# Patient Record
Sex: MALE | Race: WHITE | NOT HISPANIC OR LATINO | Employment: OTHER | URBAN - METROPOLITAN AREA
[De-identification: names, ages, dates, MRNs, and addresses within clinical notes are randomized per-mention and may not be internally consistent; named-entity substitution may affect disease eponyms.]

---

## 2018-07-03 ENCOUNTER — TRANSFERRED RECORDS (OUTPATIENT)
Dept: HEALTH INFORMATION MANAGEMENT | Facility: CLINIC | Age: 73
End: 2018-07-03

## 2018-07-04 ENCOUNTER — TRANSFERRED RECORDS (OUTPATIENT)
Dept: HEALTH INFORMATION MANAGEMENT | Facility: CLINIC | Age: 73
End: 2018-07-04

## 2018-07-05 ENCOUNTER — TRANSFERRED RECORDS (OUTPATIENT)
Dept: HEALTH INFORMATION MANAGEMENT | Facility: CLINIC | Age: 73
End: 2018-07-05

## 2018-07-06 ENCOUNTER — TRANSFERRED RECORDS (OUTPATIENT)
Dept: HEALTH INFORMATION MANAGEMENT | Facility: CLINIC | Age: 73
End: 2018-07-06

## 2018-07-18 ENCOUNTER — OFFICE VISIT (OUTPATIENT)
Dept: NEUROSURGERY | Facility: CLINIC | Age: 73
End: 2018-07-18
Attending: NEUROLOGICAL SURGERY
Payer: OTHER GOVERNMENT

## 2018-07-18 VITALS
HEIGHT: 72 IN | BODY MASS INDEX: 34.13 KG/M2 | HEART RATE: 84 BPM | OXYGEN SATURATION: 97 % | TEMPERATURE: 96.9 F | DIASTOLIC BLOOD PRESSURE: 68 MMHG | WEIGHT: 252 LBS | SYSTOLIC BLOOD PRESSURE: 115 MMHG

## 2018-07-18 DIAGNOSIS — M54.12 CERVICAL RADICULAR PAIN: Primary | ICD-10-CM

## 2018-07-18 PROCEDURE — G0463 HOSPITAL OUTPT CLINIC VISIT: HCPCS

## 2018-07-18 PROCEDURE — 99243 OFF/OP CNSLTJ NEW/EST LOW 30: CPT | Performed by: NURSE PRACTITIONER

## 2018-07-18 RX ORDER — NORTRIPTYLINE HCL 25 MG
2 CAPSULE ORAL
COMMUNITY
Start: 2014-10-24

## 2018-07-18 RX ORDER — LISINOPRIL AND HYDROCHLOROTHIAZIDE 12.5; 2 MG/1; MG/1
TABLET ORAL
COMMUNITY
Start: 2018-04-07

## 2018-07-18 RX ORDER — ALLOPURINOL 100 MG/1
100 TABLET ORAL
Refills: 0 | COMMUNITY
Start: 2018-05-30

## 2018-07-18 RX ORDER — TROSPIUM CHLORIDE 20 MG/1
25 TABLET, FILM COATED ORAL
COMMUNITY
Start: 2018-06-01

## 2018-07-18 ASSESSMENT — PAIN SCALES - GENERAL: PAINLEVEL: MILD PAIN (2)

## 2018-07-18 NOTE — NURSING NOTE
Delfino Blankenship is a 73 year old male who presents for:  Chief Complaint   Patient presents with     Neurologic Problem     Neck pain MRI done @ [facility], no PT/INJ / records from [facility] [place] (spoke with pt he will bring in CD with images JM)        Vitals:    Vitals:    07/18/18 0918   BP: 115/68   BP Location: Right arm   Patient Position: Chair   Cuff Size: Adult Large   Pulse: 84   Temp: 96.9  F (36.1  C)   TempSrc: Oral   SpO2: 97%   Weight: 252 lb (114.3 kg)   Height: 6' (1.829 m)       BMI:  Estimated body mass index is 34.18 kg/(m^2) as calculated from the following:    Height as of this encounter: 6' (1.829 m).    Weight as of this encounter: 252 lb (114.3 kg).    Pain Score:  Mild Pain (2)        Dora Quezada

## 2018-07-18 NOTE — MR AVS SNAPSHOT
"              After Visit Summary   2018    Delfino Blankenship    MRN: [id number]           Patient Information     Date Of Birth          1945        Visit Information        Provider Department      2018 9:10 AM Madelin Larkin APRN CNP [facility]        Care Instructions    Discharge instructions:    Get an EMG of your left arm.  They will call you to schedule.      Follow up with Dr Ziegler after EMG is completed.              Follow-ups after your visit        Who to contact     If you have questions or need follow up information about today's clinic visit or your schedule please contact [facility] directly at [phone].  Normal or non-critical lab and imaging results will be communicated to you by MyChart, letter or phone within 4 business days after the clinic has received the results. If you do not hear from us within 7 days, please contact the clinic through MyChart or phone. If you have a critical or abnormal lab result, we will notify you by phone as soon as possible.  Submit refill requests through [employer] or call your pharmacy and they will forward the refill request to us. Please allow 3 business days for your refill to be completed.          Additional Information About Your Visit        MyChart Information     [employer] lets you send messages to your doctor, view your test results, renew your prescriptions, schedule appointments and more. To sign up, go to [email or web address] . Click on \"Log in\" on the left side of the screen, which will take you to the Welcome page. Then click on \"Sign up Now\" on the right side of the page.     You will be asked to enter the access code listed below, as well as some personal information. Please follow the directions to create your username and password.     Your access code is: [email or web address]  Expires: 10/16/2018  8:58 AM     Your access code will  in 90 days. If you need help or a new code, please " call your [facility] clinic or [phone].        Care EveryWhere ID     This is your Care EveryWhere ID. This could be used by other organizations to access your [facility] medical records  [id number]        Your Vitals Were     Pulse Temperature Height Pulse Oximetry BMI (Body Mass Index)       84 96.9  F (36.1  C) (Oral) 6' (1.829 m) 97% 34.18 kg/m2        Blood Pressure from Last 3 Encounters:   07/18/18 115/68    Weight from Last 3 Encounters:   07/18/18 252 lb (114.3 kg)              Today, you had the following     No orders found for display       Primary Care Provider Office Phone # Fax #    Raymundo Charles Nayak [phone]       [facility] [address] [facility] [facility] [address]        Equal Access to Services     CODY MATAMOROS : Hadii aad ku hadasho Soomaali, waaxda luqadaha, qaybta kaalmada adeegyada, bertha lance . So [facility] [phone].    ATENCIÓN: Si habla español, tiene a avila disposición servicios gratuitos de asistencia lingüística. Hugo al [phone].    We comply with applicable federal civil rights laws and [place] laws. We do not discriminate on the basis of race, color, national origin, age, disability, sex, sexual orientation, or gender identity.            Thank you!     Thank you for choosing [facility]  for your care. Our goal is always to provide you with excellent care. Hearing back from our patients is one way we can continue to improve our services. Please take a few minutes to complete the written survey that you may receive in the mail after your visit with us. Thank you!             Your Updated Medication List - Protect others around you: Learn how to safely use, store and throw away your medicines at www.disposemymeds.org.          This list is accurate as of 7/18/18 10:17 AM.  Always use your most recent med list.                   Brand Name Dispense Instructions for use Diagnosis    allopurinol  100 MG tablet    ZYLOPRIM     100 mg        lisinopril-hydrochlorothiazide 20-12.5 MG per tablet    PRINZIDE/ZESTORETIC          nortriptyline 25 MG capsule    PAMELOR     Take 2 capsules by mouth        trospium 20 MG tablet    SANCTURA     25 mg

## 2018-07-18 NOTE — PATIENT INSTRUCTIONS
Discharge instructions:    Get an EMG of your left arm.  They will call you to schedule.      Follow up with Dr Ziegler after EMG is completed.

## 2018-07-18 NOTE — LETTER
7/18/2018         RE: Delfino Blankenship  [id number] [address] [address]        Dear Colleague,    Thank you for referring your patient, Delfino Blankenship, to the [facility]. Please see a copy of my visit note below.    Dr. Pete Ziegler  [facility]  Neurosurgery Clinic Visit        CC: neck and left arm pain    Primary care Provider: Zackary Nayak      Reason For Visit:   I was asked by Dr. Nayak to consult on the patient for cervical pain.      HPI: Delfino Blankenship is a 73 year old male with cervical radicular pain on the left.  He reports that he has had pain for many years. He went to a NS years ago and was told that they did not know where to begin. He went to the pain clinic and his pain has been managed on oral Nortriptyline. He then woke up on 7-3.-2018 and felt the worse neck, left arm and chest pain he has every had. He called EMS. A cardiac cause was ruled out.  He states that he was told he had a pinched nerve to his neck. He now notes neck pain with left scapular pain with neck pain. He has radicular pain down the outer portion of his left arm to the forearm with numbness to his last 2 fingers.  He is unable to full extend his hand.  He was also given Prednisone which did not help.  He states that has weakness in the left hand.  He is left handed as well. He has not had PT or injections for his pain. He states that laying down in a certain position in the recliner makes it slightly better otherwise he has constant pain.     Pain at its worst 10  Pain right now:  2    No past medical history on file.    Past Medical History reviewed with patient during visit.    No past surgical history on file.  Past Surgical History reviewed with patient during visit.    Current Outpatient Prescriptions   Medication     allopurinol (ZYLOPRIM) 100 MG tablet     lisinopril-hydrochlorothiazide (PRINZIDE/ZESTORETIC) 20-12.5 MG per tablet     nortriptyline  (PAMELOR) 25 MG capsule     trospium (SANCTURA) 20 MG tablet     No current facility-administered medications for this visit.        Not on File    Social History     Social History     Marital status:      Spouse name: N/A     Number of children: N/A     Years of education: N/A     Social History Main Topics     Smoking status: Not on file     Smokeless tobacco: Not on file     Alcohol use Not on file     Drug use: Not on file     Sexual activity: Not on file     Other Topics Concern     Not on file     Social History Narrative       No family history on file.      Review Of Systems  Skin: negative  Eyes: negative  Ears/Nose/Throat: negative  Respiratory: No shortness of breath, dyspnea on exertion, cough, or hemoptysis  Cardiovascular: HTN  Gastrointestinal: negative  Genitourinary: prostate CA with radiation   Musculoskeletal: neck pain, bilateral shoulder surgery, 6 hand surgeries.    Neurologic: left upper extremity pain and weakness  Psychiatric: negative  Hematologic/Lymphatic/Immunologic: negative  Endocrine: negative     ROS: 10 point ROS neg other than the symptoms noted above in the HPI.    Vital Signs: /68 (BP Location: Right arm, Patient Position: Chair, Cuff Size: Adult Large)  Pulse 84  Temp 96.9  F (36.1  C) (Oral)  Ht 6' (1.829 m)  Wt 252 lb (114.3 kg)  SpO2 97%  BMI 34.18 kg/m2    Examination:  Constitutional:  Alert, well nourished, NAD.  Memory: recent and remote memory intact  HEENT: Normocephalic, atraumatic.   Pulm:  Without shortness of breath   CV:  No pitting edema of BLE.    Neurological:  Awake  Alert  Oriented x 3  Speech clear  Cranial nerves II - XII intact  PERRL  EOMI  Face symmetric  Tongue midline  Motor exam   Shoulder Abduction:  Right:  5/5   Left:  5/5  Biceps:                      Right:  5/5   Left:  5/5  Triceps:                     Right:  5/5   Left:  5/5  Wrist Extensors:       Right:  5/5   Left:  3/5  Wrist Flexors:           Right:  5/5   Left:   3/5  Intrinsics:                   Right:  5/5   Left:  5/5   Hip Flexor:                Right: 5/5  Left:  5/5  Hip Adductor:             Right:  5/5  Left:  5/5  Hip Abductor:             Right:  5/5  Left:  5/5  Gastroc Soleus:        Right:  5/5  Left:  5/5  Tib/Ant:                      Right:  5/5  Left:  5/5  EHL:                          Right:  5/5  Left:  5/5   Sensation normal to bilateral upper and lower extremities  Muscle tone to bilateral upper and lower extremities normal   Gait: Able to stand from a seated position. Normal non-antalgic, non-myelopathic gait.    Cervical examination reveals restricted and painful extension. Tenderness to palpation of the cervical spine and paraspinous muscles bilaterally.      Imaging:     Cervical MRI:    7-6-2018    1. Cervical spondylosis and facet arthrosis resulting in foraminal stenosis, marked bilaterally from C3-4 through C5-6 and on the left at C6-7, also moderate acquired canal stenosis, multilevel cord contouring present although cord signal is normal.    2.  Facet arthrosis, marked bilaterally at C3-4 and on the left at C2-3, C4-5 and C7-T1.     Assessment/Plan:    Delfino Blankenship is a 73 year old male with cervical radicular pain on the left.  He reports that he has had pain for many years. He went to a NSG years ago and was told that they did not know where to begin. He went to the pain clinic and his pain has been managed on oral Nortriptyline. He then woke up on 7-3.-2018 and felt the worse neck, left arm and chest pain he has every had. He called EMS. A cardiac cause was ruled out.  He states that he was told he had a pinched nerve to his neck. He now notes neck pain with left scapular pain with neck pain. He has radicular pain down the outer portion of his left arm to the forearm with numbness to his last 2 fingers.  He is unable to full extend his hand.  He was also given Prednisone which did not help.  He states that has weakness in the left hand.   He is left handed as well. He has not had PT or injections for his pain. He states that laying down in a certain position in the recliner makes it slightly better otherwise he has constant pain. The pt has noted cervical restriction and contracted left middle, fourth and pinky finger. He is unable to straighten them but has full  strength.  He was seen by Dr. Pete Ziegler as well.  His MRI does not correlate with the hand symptoms. At this time we will have him obtain an EMG and have him return to see Dr. Pete Ziegler.      Patient Instructions   Discharge instructions:    Get an EMG of your left arm.  They will call you to schedule.      Follow up with Dr Ziegler after EMG is completed.               Madelin Larkin CNP  [facility]  [address] [address]    Tel [phone]  Pager [phone]      Again, thank you for allowing me to participate in the care of your patient.        Sincerely,        LAKE Kim CNP

## 2018-07-18 NOTE — PROGRESS NOTES
Dr. Pete Ziegler  [facility]  Neurosurgery Clinic Visit        CC: neck and left arm pain    Primary care Provider: Zackary Nayak      Reason For Visit:   I was asked by Dr. Nayak to consult on the patient for cervical pain.      HPI: Delfino Blankenship is a 73 year old male with cervical radicular pain on the left.  He reports that he has had pain for many years. He went to a NS years ago and was told that they did not know where to begin. He went to the pain clinic and his pain has been managed on oral Nortriptyline. He then woke up on 7-3.-2018 and felt the worse neck, left arm and chest pain he has every had. He called EMS. A cardiac cause was ruled out.  He states that he was told he had a pinched nerve to his neck. He now notes neck pain with left scapular pain with neck pain. He has radicular pain down the outer portion of his left arm to the forearm with numbness to his last 2 fingers.  He is unable to full extend his hand.  He was also given Prednisone which did not help.  He states that has weakness in the left hand.  He is left handed as well. He has not had PT or injections for his pain. He states that laying down in a certain position in the recliner makes it slightly better otherwise he has constant pain.     Pain at its worst 10  Pain right now:  2    No past medical history on file.    Past Medical History reviewed with patient during visit.    No past surgical history on file.  Past Surgical History reviewed with patient during visit.    Current Outpatient Prescriptions   Medication     allopurinol (ZYLOPRIM) 100 MG tablet     lisinopril-hydrochlorothiazide (PRINZIDE/ZESTORETIC) 20-12.5 MG per tablet     nortriptyline (PAMELOR) 25 MG capsule     trospium (SANCTURA) 20 MG tablet     No current facility-administered medications for this visit.        Not on File    Social History     Social History     Marital status:      Spouse name: N/A     Number of  children: N/A     Years of education: N/A     Social History Main Topics     Smoking status: Not on file     Smokeless tobacco: Not on file     Alcohol use Not on file     Drug use: Not on file     Sexual activity: Not on file     Other Topics Concern     Not on file     Social History Narrative       No family history on file.      Review Of Systems  Skin: negative  Eyes: negative  Ears/Nose/Throat: negative  Respiratory: No shortness of breath, dyspnea on exertion, cough, or hemoptysis  Cardiovascular: HTN  Gastrointestinal: negative  Genitourinary: prostate CA with radiation   Musculoskeletal: neck pain, bilateral shoulder surgery, 6 hand surgeries.    Neurologic: left upper extremity pain and weakness  Psychiatric: negative  Hematologic/Lymphatic/Immunologic: negative  Endocrine: negative     ROS: 10 point ROS neg other than the symptoms noted above in the HPI.    Vital Signs: /68 (BP Location: Right arm, Patient Position: Chair, Cuff Size: Adult Large)  Pulse 84  Temp 96.9  F (36.1  C) (Oral)  Ht 6' (1.829 m)  Wt 252 lb (114.3 kg)  SpO2 97%  BMI 34.18 kg/m2    Examination:  Constitutional:  Alert, well nourished, NAD.  Memory: recent and remote memory intact  HEENT: Normocephalic, atraumatic.   Pulm:  Without shortness of breath   CV:  No pitting edema of BLE.    Neurological:  Awake  Alert  Oriented x 3  Speech clear  Cranial nerves II - XII intact  PERRL  EOMI  Face symmetric  Tongue midline  Motor exam   Shoulder Abduction:  Right:  5/5   Left:  5/5  Biceps:                      Right:  5/5   Left:  5/5  Triceps:                     Right:  5/5   Left:  5/5  Wrist Extensors:       Right:  5/5   Left:  3/5  Wrist Flexors:           Right:  5/5   Left:  3/5  Intrinsics:                   Right:  5/5   Left:  5/5   Hip Flexor:                Right: 5/5  Left:  5/5  Hip Adductor:             Right:  5/5  Left:  5/5  Hip Abductor:             Right:  5/5  Left:  5/5  Gastroc Soleus:        Right:  5/5   Left:  5/5  Tib/Ant:                      Right:  5/5  Left:  5/5  EHL:                          Right:  5/5  Left:  5/5   Sensation normal to bilateral upper and lower extremities  Muscle tone to bilateral upper and lower extremities normal   Gait: Able to stand from a seated position. Normal non-antalgic, non-myelopathic gait.    Cervical examination reveals restricted and painful extension. Tenderness to palpation of the cervical spine and paraspinous muscles bilaterally.      Imaging:     Cervical MRI:    7-6-2018    1. Cervical spondylosis and facet arthrosis resulting in foraminal stenosis, marked bilaterally from C3-4 through C5-6 and on the left at C6-7, also moderate acquired canal stenosis, multilevel cord contouring present although cord signal is normal.    2.  Facet arthrosis, marked bilaterally at C3-4 and on the left at C2-3, C4-5 and C7-T1.     Assessment/Plan:    Delfino Blankenship is a 73 year old male with cervical radicular pain on the left.  He reports that he has had pain for many years. He went to a NS years ago and was told that they did not know where to begin. He went to the pain clinic and his pain has been managed on oral Nortriptyline. He then woke up on 7-3.-2018 and felt the worse neck, left arm and chest pain he has every had. He called EMS. A cardiac cause was ruled out.  He states that he was told he had a pinched nerve to his neck. He now notes neck pain with left scapular pain with neck pain. He has radicular pain down the outer portion of his left arm to the forearm with numbness to his last 2 fingers.  He is unable to full extend his hand.  He was also given Prednisone which did not help.  He states that has weakness in the left hand.  He is left handed as well. He has not had PT or injections for his pain. He states that laying down in a certain position in the recliner makes it slightly better otherwise he has constant pain. The pt has noted cervical restriction and contracted  left middle, fourth and pinky finger. He is unable to straighten them but has full  strength.  He was seen by Dr. Pete Ziegler as well.  His MRI does not correlate with the hand symptoms. At this time we will have him obtain an EMG and have him return to see Dr. Pete Ziegler.      Patient Instructions   Discharge instructions:    Get an EMG of your left arm.  They will call you to schedule.      Follow up with Dr Ziegler after EMG is completed.               Madelin Larkin [facility]  [facility]  [address] [address]    Tel [phone]  Pager [phone]

## 2018-08-01 ENCOUNTER — TRANSFERRED RECORDS (OUTPATIENT)
Dept: HEALTH INFORMATION MANAGEMENT | Facility: CLINIC | Age: 73
End: 2018-08-01

## 2018-08-07 ENCOUNTER — TELEPHONE (OUTPATIENT)
Dept: NEUROSURGERY | Facility: CLINIC | Age: 73
End: 2018-08-07

## 2018-08-07 NOTE — TELEPHONE ENCOUNTER
pts EMG shows possible cervical radiculopathy at C8 vs. Lower trunk or plexus injury on the left.

## 2018-08-29 ENCOUNTER — RADIANT APPOINTMENT (OUTPATIENT)
Dept: GENERAL RADIOLOGY | Facility: CLINIC | Age: 73
End: 2018-08-29
Attending: NURSE PRACTITIONER
Payer: OTHER GOVERNMENT

## 2018-08-29 ENCOUNTER — OFFICE VISIT (OUTPATIENT)
Dept: NEUROSURGERY | Facility: CLINIC | Age: 73
End: 2018-08-29
Attending: NEUROLOGICAL SURGERY
Payer: OTHER GOVERNMENT

## 2018-08-29 VITALS
DIASTOLIC BLOOD PRESSURE: 75 MMHG | TEMPERATURE: 97.4 F | BODY MASS INDEX: 34.13 KG/M2 | WEIGHT: 252 LBS | HEIGHT: 72 IN | OXYGEN SATURATION: 98 % | HEART RATE: 74 BPM | SYSTOLIC BLOOD PRESSURE: 122 MMHG

## 2018-08-29 DIAGNOSIS — M54.12 CERVICAL RADICULAR PAIN: ICD-10-CM

## 2018-08-29 DIAGNOSIS — R29.898 LEFT HAND WEAKNESS: ICD-10-CM

## 2018-08-29 DIAGNOSIS — M54.12 CERVICAL RADICULAR PAIN: Primary | ICD-10-CM

## 2018-08-29 PROCEDURE — 72020 X-RAY EXAM OF SPINE 1 VIEW: CPT

## 2018-08-29 PROCEDURE — G0463 HOSPITAL OUTPT CLINIC VISIT: HCPCS

## 2018-08-29 PROCEDURE — 99213 OFFICE O/P EST LOW 20 MIN: CPT | Performed by: NEUROLOGICAL SURGERY

## 2018-08-29 RX ORDER — ASPIRIN 81 MG/1
81 TABLET ORAL DAILY
COMMUNITY

## 2018-08-29 ASSESSMENT — PAIN SCALES - GENERAL: PAINLEVEL: NO PAIN (0)

## 2018-08-29 NOTE — LETTER
8/29/2018         RE: Delfino Blankenship  [id number] [address] [address]        Dear Colleague,    Thank you for referring your patient, Delfino Blankenship, to the [facility]. Please see a copy of my visit note below.    Neurosurgery Follow Up Clinic Visit      Delfino Blankenship returns for follow up visit regarding his left upper extremity weakness    Currently the patient describes having woken up on July 2 with severe pain down his left upper extremity into his hand mostly affecting the third through fifth digits.  He begin to develop weakness in his hand and also atrophy, but, his radicular pain has significantly improved.  He has had an epidural steroid injection without resolution and has not tried physical therapy.    Exam is stable  He is noted to have significant atrophy in his left hand he has 5/5 deltoid, 5/5 biceps, 4/5 triceps all in  strength and intrinsics are 4-/5 in the left upper extremity    EMG/NCV reveals left C8 radiculopathy versus brachial plexus injury    We reviewed the more of the cervical spine that shows multilevel spondylosis with foraminal stenosis and some left-sided stenosis at C6-7 and C7/T1      Plan:   I discussed with the patient option of C6 through T1 ACDF and he would discuss with his spouse and call if he wants to proceed  We will also obtain a right lateral cervical x-ray              Again, thank you for allowing me to participate in the care of your patient.        Sincerely,        Pete Ziegler MD

## 2018-08-29 NOTE — NURSING NOTE
Delfino Blankenship is a 73 year old male who presents for:  Chief Complaint   Patient presents with     Neurologic Problem     follow up cervical radiculopathy, discuss EMG and MRI        Vitals:    There were no vitals filed for this visit.    BMI:  Estimated body mass index is 34.18 kg/(m^2) as calculated from the following:    Height as of 7/18/18: 6' (1.829 m).    Weight as of 7/18/18: 252 lb (114.3 kg).    Pain Score:  Data Unavailable        Karla Miller CMA, AAS

## 2018-08-29 NOTE — PROGRESS NOTES
Neurosurgery Follow Up Clinic Visit      Delfino Blankenship returns for follow up visit regarding his left upper extremity weakness    Currently the patient describes having woken up on July 2 with severe pain down his left upper extremity into his hand mostly affecting the third through fifth digits.  He begin to develop weakness in his hand and also atrophy, but, his radicular pain has significantly improved.  He has had an epidural steroid injection without resolution and has not tried physical therapy.    Exam is stable  He is noted to have significant atrophy in his left hand he has 5/5 deltoid, 5/5 biceps, 4/5 triceps all in  strength and intrinsics are 4-/5 in the left upper extremity    EMG/NCV reveals left C8 radiculopathy versus brachial plexus injury    We reviewed the more of the cervical spine that shows multilevel spondylosis with foraminal stenosis and some left-sided stenosis at C6-7 and C7/T1      Plan:   I discussed with the patient option of C6 through T1 ACDF and he would discuss with his spouse and call if he wants to proceed  We will also obtain a right lateral cervical x-ray

## 2018-08-29 NOTE — MR AVS SNAPSHOT
After Visit Summary   8/29/2018    Delfino Blankenship    MRN: [id number]           Patient Information     Date Of Birth          1945        Visit Information        Provider Department      8/29/2018 11:10 AM Pete Ziegler MD [facility]        Today's Diagnoses     Cervical radicular pain    -  1      Care Instructions    1.  Xray today      2. Please contact the clinic if pain persists at [phone].            Follow-ups after your visit        Who to contact     If you have questions or need follow up information about today's clinic visit or your schedule please contact [place] [facility] directly at [phone].  Normal or non-critical lab and imaging results will be communicated to you by MyChart, letter or phone within 4 business days after the clinic has received the results. If you do not hear from us within 7 days, please contact the clinic through [employer] or phone. If you have a critical or abnormal lab result, we will notify you by phone as soon as possible.  Submit refill requests through [employer] or call your pharmacy and they will forward the refill request to us. Please allow 3 business days for your refill to be completed.          Additional Information About Your Visit        MyChart Information     [employer] gives you secure access to your electronic health record. If you see a primary care provider, you can also send messages to your care team and make appointments. If you have questions, please call your primary care clinic.  If you do not have a primary care provider, please call [phone] and they will assist you.        Care EveryWhere ID     This is your Care EveryWhere ID. This could be used by other organizations to access your [place] medical records  [id number]        Your Vitals Were     Pulse Temperature Height Pulse Oximetry BMI (Body Mass Index)       74 97.4  F (36.3  C) (Oral) 6' (1.829 m) 98% 34.18 kg/m2         Blood Pressure from Last 3 Encounters:   08/29/18 122/75   07/18/18 115/68    Weight from Last 3 Encounters:   08/29/18 252 lb (114.3 kg)   07/18/18 252 lb (114.3 kg)               Primary Care Provider Office Phone # Fax Jose Martin Raymundo Charles Nayak [phone]       [address] [address]        Equal Access to Services     CODY MATAMOROS : Hadii aad ku hadasho Soomaali, waaxda luqadaha, qaybta kaalmada adeegyada, waxay idiin hayaan adeeg kharash la'aan ah. So [facility] [phone].    ATENCIÓN: Si habla español, tiene a avila disposición servicios gratuitos de asistencia lingüística. [facility] [phone].    We comply with applicable federal civil rights laws and [place] laws. We do not discriminate on the basis of race, color, national origin, age, disability, sex, sexual orientation, or gender identity.            Thank you!     Thank you for choosing [facility]  for your care. Our goal is always to provide you with excellent care. Hearing back from our patients is one way we can continue to improve our services. Please take a few minutes to complete the written survey that you may receive in the mail after your visit with us. Thank you!             Your Updated Medication List - Protect others around you: Learn how to safely use, store and throw away your medicines at www.disposemymeds.org.          This list is accurate as of 8/29/18 11:36 AM.  Always use your most recent med list.                   Brand Name Dispense Instructions for use Diagnosis    allopurinol 100 MG tablet    ZYLOPRIM     100 mg        aspirin 81 MG EC tablet      Take 81 mg by mouth daily        lisinopril-hydrochlorothiazide 20-12.5 MG per tablet    PRINZIDE/ZESTORETIC          nortriptyline 25 MG capsule    PAMELOR     Take 2 capsules by mouth        trospium 20 MG tablet    SANCTURA     25 mg

## 2018-09-12 ENCOUNTER — OFFICE VISIT (OUTPATIENT)
Dept: NEUROSURGERY | Facility: CLINIC | Age: 73
End: 2018-09-12
Attending: NEUROLOGICAL SURGERY
Payer: OTHER GOVERNMENT

## 2018-09-12 VITALS
HEART RATE: 69 BPM | OXYGEN SATURATION: 95 % | SYSTOLIC BLOOD PRESSURE: 125 MMHG | RESPIRATION RATE: 18 BRPM | DIASTOLIC BLOOD PRESSURE: 75 MMHG | TEMPERATURE: 97.2 F

## 2018-09-12 DIAGNOSIS — M54.12 CERVICAL RADICULAR PAIN: Primary | ICD-10-CM

## 2018-09-12 PROCEDURE — 99214 OFFICE O/P EST MOD 30 MIN: CPT | Performed by: NURSE PRACTITIONER

## 2018-09-12 ASSESSMENT — PAIN SCALES - GENERAL: PAINLEVEL: NO PAIN (0)

## 2018-09-12 NOTE — MR AVS SNAPSHOT
After Visit Summary   9/12/2018    Delfino Blankenship    MRN: [id number]           Patient Information     Date Of Birth          1945        Visit Information        Provider Department      9/12/2018 2:20 PM Madelin Larkin APRN CNP [facility]        Care Instructions    1. Contact the clinic at [phone] to schedule your surgery if you would like to proceed.            Follow-ups after your visit        Who to contact     If you have questions or need follow up information about today's clinic visit or your schedule please contact [place] [facility] directly at [phone].  Normal or non-critical lab and imaging results will be communicated to you by [employer], letter or phone within 4 business days after the clinic has received the results. If you do not hear from us within 7 days, please contact the clinic through [employer] or phone. If you have a critical or abnormal lab result, we will notify you by phone as soon as possible.  Submit refill requests through [employer] or call your pharmacy and they will forward the refill request to us. Please allow 3 business days for your refill to be completed.          Additional Information About Your Visit        MyChart Information     [employer] gives you secure access to your electronic health record. If you see a primary care provider, you can also send messages to your care team and make appointments. If you have questions, please call your primary care clinic.  If you do not have a primary care provider, please call [phone] and they will assist you.        Care EveryWhere ID     This is your Care EveryWhere ID. This could be used by other organizations to access your [place] medical records  [id number]        Your Vitals Were     Pulse Temperature Respirations Pulse Oximetry          69 97.2  F (36.2  C) 18 95%         Blood Pressure from Last 3 Encounters:   09/12/18 125/75   08/29/18 122/75   07/18/18  115/68    Weight from Last 3 Encounters:   08/29/18 252 lb (114.3 kg)   07/18/18 252 lb (114.3 kg)              Today, you had the following     No orders found for display       Primary Care Provider Office Phone # Fax #    Raymundo Charles Nayak [phone]       [address] [address]        Equal Access to Services     CODY MATAMOROS : Hadii aad ku hadasho Soomaali, waaxda luqadaha, qaybta kaalmada adeegyada, waxay idiin hayaan adeeg kharash la'aan ah. So [facility] [phone].    ATENCIÓN: Si reynoldla cat, tiene a avila disposición servicios gratuitos de asistencia lingüística. Llame al [phone].    We comply with applicable federal civil rights laws and [place] laws. We do not discriminate on the basis of race, color, national origin, age, disability, sex, sexual orientation, or gender identity.            Thank you!     Thank you for choosing [facility]  for your care. Our goal is always to provide you with excellent care. Hearing back from our patients is one way we can continue to improve our services. Please take a few minutes to complete the written survey that you may receive in the mail after your visit with us. Thank you!             Your Updated Medication List - Protect others around you: Learn how to safely use, store and throw away your medicines at www.disposemymeds.org.          This list is accurate as of 9/12/18  2:52 PM.  Always use your most recent med list.                   Brand Name Dispense Instructions for use Diagnosis    allopurinol 100 MG tablet    ZYLOPRIM     100 mg        aspirin 81 MG EC tablet      Take 81 mg by mouth daily        lisinopril-hydrochlorothiazide 20-12.5 MG per tablet    PRINZIDE/ZESTORETIC          nortriptyline 25 MG capsule    PAMELOR     Take 2 capsules by mouth        trospium 20 MG tablet    SANCTURA     25 mg

## 2018-09-12 NOTE — LETTER
9/12/2018         RE: Delfino Blankenship  [address] [address] [address]        Dear Colleague,    Thank you for referring your patient, Delfino Blankenship, to the [facility]. Please see a copy of my visit note below.    [facility]  Neurosurgery followup:    HPI: Mr. Blankenship is a 73 year old male that returns today to discuss his proposed C7-T1 ACDF. The pt is and his wife had many questions and were asked to come in to review their questions.      Exam:  Constitutional:  Alert, well nourished, NAD.  HEENT: Normocephalic, atraumatic.   Pulm:  Without shortness of breath   CV:  No pitting edema of BLE.     Neurological:  Awake  Alert  Oriented x 3  Motor exam:      A/P: Mr. Blankenship is a 73 year old male that returns today to discuss his proposed C7-T1 ACDF. The pt is and his wife had many questions and were asked to come in to review their questions.  The pt had questions regarding pre and post op considerations were answered.  The pt also had questions regarding his surgery what to expect as well as post op risks.  The possibility of ongoing nerve pain, hoarse voice and swallowing difficulties were also discussed. The pt was asked to call the clinic if he would like to proceed. Their questions were answered to the best of my abilities.       Patient Instructions   1. Contact the clinic at [phone] to schedule your surgery if you would like to proceed.         100% of this 25 minute visit was spent in direct face to face education and counseling with the pt and his wife.          Madelin Larkin CNP  [facility]  [address] [address]    Tel [phone]  Pager [phone]      Again, thank you for allowing me to participate in the care of your patient.        Sincerely,        LAKE Kim CNP

## 2018-09-12 NOTE — NURSING NOTE
Delfino Blankenship is a 73 year old male who presents for:  Chief Complaint   Patient presents with     Numbness        Initial Vitals:  There were no vitals taken for this visit. Estimated body mass index is 34.18 kg/(m^2) as calculated from the following:    Height as of 8/29/18: 6' (1.829 m).    Weight as of 8/29/18: 252 lb (114.3 kg).. There is no height or weight on file to calculate BSA. BP completed using cuff size: regular  Data Unavailable    Do you feel safe in your environment?  Yes  Do you need any refills today? No    Nursing Comments: Left arm numbness        Karmen Martel

## 2018-09-12 NOTE — PROGRESS NOTES
[facility]  Neurosurgery followup:    HPI: Mr. Blankenship is a 73 year old male that returns today to discuss his proposed C7-T1 ACDF. The pt is and his wife had many questions and were asked to come in to review their questions.      Exam:  Constitutional:  Alert, well nourished, NAD.  HEENT: Normocephalic, atraumatic.   Pulm:  Without shortness of breath   CV:  No pitting edema of BLE.     Neurological:  Awake  Alert  Oriented x 3  Motor exam:      A/P: Mr. Blankenship is a 73 year old male that returns today to discuss his proposed C7-T1 ACDF. The pt is and his wife had many questions and were asked to come in to review their questions.  The pt had questions regarding pre and post op considerations were answered.  The pt also had questions regarding his surgery what to expect as well as post op risks.  The possibility of ongoing nerve pain, hoarse voice and swallowing difficulties were also discussed. The pt was asked to call the clinic if he would like to proceed. Their questions were answered to the best of my abilities.       Patient Instructions   1. Contact the clinic at [phone] to schedule your surgery if you would like to proceed.         100% of this 25 minute visit was spent in direct face to face education and counseling with the pt and his wife.          Madelin Larkin [facility]  [facility]  [address] [address]    Tel [phone]  Pager [phone]

## 2018-09-14 ENCOUNTER — TELEPHONE (OUTPATIENT)
Dept: NEUROSURGERY | Facility: CLINIC | Age: 73
End: 2018-09-14

## 2018-09-14 NOTE — TELEPHONE ENCOUNTER
REASON FOR CALL:  Patient called and left message on voicemail to schedule surgery. Sent request to provider for Surgery form.    Detailed message can be left:  YES

## 2018-09-17 ENCOUNTER — HOSPITAL ENCOUNTER (INPATIENT)
Facility: CLINIC | Age: 73
Setting detail: SURGERY ADMIT
End: 2018-09-17
Attending: NEUROLOGICAL SURGERY | Admitting: NEUROLOGICAL SURGERY
Payer: OTHER GOVERNMENT

## 2018-09-19 NOTE — TELEPHONE ENCOUNTER
Type of surgery: C6-T1 ACDF  Location of surgery: [facility]  Date and time of surgery: 10/15/2018  Surgeon: Dr. Ziegler  Pre-Op Appt Date: TBD. Discussed.  Post-Op Appt Date: 11/26/2018. [place] location   Packet sent out: Yes  Pre-cert/Authorization completed:  Yes  Date: TBD

## 2020-03-11 ENCOUNTER — HEALTH MAINTENANCE LETTER (OUTPATIENT)
Age: 75
End: 2020-03-11

## 2021-01-03 ENCOUNTER — HEALTH MAINTENANCE LETTER (OUTPATIENT)
Age: 76
End: 2021-01-03

## 2021-04-25 ENCOUNTER — HEALTH MAINTENANCE LETTER (OUTPATIENT)
Age: 76
End: 2021-04-25

## 2021-10-10 ENCOUNTER — HEALTH MAINTENANCE LETTER (OUTPATIENT)
Age: 76
End: 2021-10-10

## 2022-05-21 ENCOUNTER — HEALTH MAINTENANCE LETTER (OUTPATIENT)
Age: 77
End: 2022-05-21

## 2022-09-18 ENCOUNTER — HEALTH MAINTENANCE LETTER (OUTPATIENT)
Age: 77
End: 2022-09-18

## 2023-05-22 ENCOUNTER — TRANSFERRED RECORDS (OUTPATIENT)
Dept: HEALTH INFORMATION MANAGEMENT | Facility: CLINIC | Age: 78
End: 2023-05-22

## 2023-06-04 ENCOUNTER — HEALTH MAINTENANCE LETTER (OUTPATIENT)
Age: 78
End: 2023-06-04

## 2023-11-09 ENCOUNTER — TRANSFERRED RECORDS (OUTPATIENT)
Dept: HEALTH INFORMATION MANAGEMENT | Facility: CLINIC | Age: 78
End: 2023-11-09

## 2024-01-29 ENCOUNTER — TRANSFERRED RECORDS (OUTPATIENT)
Dept: HEALTH INFORMATION MANAGEMENT | Facility: CLINIC | Age: 79
End: 2024-01-29

## 2024-02-08 ENCOUNTER — TRANSFERRED RECORDS (OUTPATIENT)
Dept: HEALTH INFORMATION MANAGEMENT | Facility: CLINIC | Age: 79
End: 2024-02-08

## 2024-04-23 ENCOUNTER — MEDICAL CORRESPONDENCE (OUTPATIENT)
Dept: HEALTH INFORMATION MANAGEMENT | Facility: CLINIC | Age: 79
End: 2024-04-23
Payer: OTHER GOVERNMENT

## 2024-04-24 ENCOUNTER — TRANSCRIBE ORDERS (OUTPATIENT)
Dept: OTHER | Age: 79
End: 2024-04-24

## 2024-04-24 DIAGNOSIS — N50.89 OTHER SPECIFIED DISORDERS OF THE MALE GENITAL ORGANS: Primary | ICD-10-CM

## 2025-04-22 ENCOUNTER — APPOINTMENT (OUTPATIENT)
Age: 80
Setting detail: DERMATOLOGY
End: 2025-04-22

## 2025-04-22 DIAGNOSIS — L82.1 OTHER SEBORRHEIC KERATOSIS: ICD-10-CM

## 2025-04-22 DIAGNOSIS — Z85.828 PERSONAL HISTORY OF OTHER MALIGNANT NEOPLASM OF SKIN: ICD-10-CM

## 2025-04-22 DIAGNOSIS — L57.0 ACTINIC KERATOSIS: ICD-10-CM

## 2025-04-22 DIAGNOSIS — D18.0 HEMANGIOMA: ICD-10-CM

## 2025-04-22 DIAGNOSIS — Z71.89 OTHER SPECIFIED COUNSELING: ICD-10-CM

## 2025-04-22 DIAGNOSIS — D22 MELANOCYTIC NEVI: ICD-10-CM

## 2025-04-22 PROBLEM — D18.01 HEMANGIOMA OF SKIN AND SUBCUTANEOUS TISSUE: Status: ACTIVE | Noted: 2025-04-22

## 2025-04-22 PROBLEM — D22.5 MELANOCYTIC NEVI OF TRUNK: Status: ACTIVE | Noted: 2025-04-22

## 2025-04-22 PROCEDURE — 99213 OFFICE O/P EST LOW 20 MIN: CPT

## 2025-04-22 PROCEDURE — ? COUNSELING

## 2025-04-22 PROCEDURE — ? SUNSCREEN RECOMMENDATIONS

## 2025-04-22 PROCEDURE — ? OBSERVATION

## 2025-04-22 ASSESSMENT — LOCATION SIMPLE DESCRIPTION DERM
LOCATION SIMPLE: ABDOMEN
LOCATION SIMPLE: UPPER BACK
LOCATION SIMPLE: CHEST
LOCATION SIMPLE: SCALP
LOCATION SIMPLE: RIGHT UPPER BACK
LOCATION SIMPLE: SCALP
LOCATION SIMPLE: LEFT HAND
LOCATION SIMPLE: SUPERIOR FOREHEAD

## 2025-04-22 ASSESSMENT — LOCATION ZONE DERM
LOCATION ZONE: FACE
LOCATION ZONE: HAND
LOCATION ZONE: SCALP
LOCATION ZONE: SCALP
LOCATION ZONE: TRUNK

## 2025-04-22 ASSESSMENT — LOCATION DETAILED DESCRIPTION DERM
LOCATION DETAILED: SUPERIOR MID FOREHEAD
LOCATION DETAILED: RIGHT CENTRAL PARIETAL SCALP
LOCATION DETAILED: LEFT ULNAR DORSAL HAND
LOCATION DETAILED: XIPHOID
LOCATION DETAILED: STERNUM
LOCATION DETAILED: RIGHT MEDIAL UPPER BACK
LOCATION DETAILED: INFERIOR THORACIC SPINE
LOCATION DETAILED: SUPERIOR THORACIC SPINE
LOCATION DETAILED: RIGHT CENTRAL PARIETAL SCALP

## 2025-04-22 NOTE — HPI: SKIN LESION (ACTINIC KERATOSES)
How Severe Are They?: mild
Is This A New Presentation, Or A Follow-Up?: Follow Up Actinic Keratoses
Additional History: None at the last visit

## 2025-04-22 NOTE — PROCEDURE: OBSERVATION
Principal Discharge DX:	Ankle injury, right, initial encounter
Body Location Override (Optional - Billing Will Still Be Based On Selected Body Map Location If Applicable): Left dorsal hand, right cheek, and left vertex scalp.
Detail Level: Detailed
Size Of Lesion In Cm (Optional): 0

## 2025-04-22 NOTE — HPI: NON-MELANOMA SKIN CANCER F/U (HISTORY OF NMSC)
How Many Skin Cancers Have You Had?: more than one
What Is The Reason For Today's Visit?: History of Non-Melanoma Skin Cancer
Additional History: Left dorsal hand, right cheek, and left vertex scalp.

## 2025-07-16 ENCOUNTER — HOSPITAL ENCOUNTER (OUTPATIENT)
Age: 80
LOS: 1 days | Discharge: HOME | End: 2025-07-17
Payer: MEDICARE

## 2025-07-16 VITALS
HEART RATE: 57 BPM | RESPIRATION RATE: 14 BRPM | DIASTOLIC BLOOD PRESSURE: 66 MMHG | OXYGEN SATURATION: 96 % | SYSTOLIC BLOOD PRESSURE: 92 MMHG

## 2025-07-16 VITALS
SYSTOLIC BLOOD PRESSURE: 138 MMHG | HEART RATE: 55 BPM | DIASTOLIC BLOOD PRESSURE: 78 MMHG | TEMPERATURE: 98 F | RESPIRATION RATE: 14 BRPM | OXYGEN SATURATION: 100 %

## 2025-07-16 VITALS — DIASTOLIC BLOOD PRESSURE: 75 MMHG | SYSTOLIC BLOOD PRESSURE: 134 MMHG

## 2025-07-16 VITALS — OXYGEN SATURATION: 98 % | HEART RATE: 56 BPM | RESPIRATION RATE: 14 BRPM

## 2025-07-16 VITALS
DIASTOLIC BLOOD PRESSURE: 80 MMHG | SYSTOLIC BLOOD PRESSURE: 153 MMHG | TEMPERATURE: 98.1 F | HEART RATE: 56 BPM | OXYGEN SATURATION: 96 % | RESPIRATION RATE: 16 BRPM

## 2025-07-16 VITALS
RESPIRATION RATE: 16 BRPM | TEMPERATURE: 97.52 F | OXYGEN SATURATION: 96 % | HEART RATE: 93 BPM | DIASTOLIC BLOOD PRESSURE: 80 MMHG | SYSTOLIC BLOOD PRESSURE: 129 MMHG

## 2025-07-16 VITALS
SYSTOLIC BLOOD PRESSURE: 132 MMHG | TEMPERATURE: 96.8 F | OXYGEN SATURATION: 93 % | RESPIRATION RATE: 16 BRPM | HEART RATE: 61 BPM | DIASTOLIC BLOOD PRESSURE: 73 MMHG

## 2025-07-16 VITALS
DIASTOLIC BLOOD PRESSURE: 71 MMHG | OXYGEN SATURATION: 98 % | SYSTOLIC BLOOD PRESSURE: 133 MMHG | RESPIRATION RATE: 14 BRPM | HEART RATE: 59 BPM

## 2025-07-16 VITALS
HEART RATE: 60 BPM | TEMPERATURE: 98.06 F | RESPIRATION RATE: 16 BRPM | OXYGEN SATURATION: 97 % | DIASTOLIC BLOOD PRESSURE: 83 MMHG | SYSTOLIC BLOOD PRESSURE: 170 MMHG

## 2025-07-16 VITALS
SYSTOLIC BLOOD PRESSURE: 138 MMHG | TEMPERATURE: 96.98 F | DIASTOLIC BLOOD PRESSURE: 82 MMHG | OXYGEN SATURATION: 98 % | RESPIRATION RATE: 16 BRPM | HEART RATE: 75 BPM

## 2025-07-16 VITALS
DIASTOLIC BLOOD PRESSURE: 83 MMHG | HEART RATE: 70 BPM | TEMPERATURE: 97 F | OXYGEN SATURATION: 95 % | RESPIRATION RATE: 16 BRPM | SYSTOLIC BLOOD PRESSURE: 127 MMHG

## 2025-07-16 VITALS
RESPIRATION RATE: 14 BRPM | HEART RATE: 59 BPM | DIASTOLIC BLOOD PRESSURE: 71 MMHG | OXYGEN SATURATION: 96 % | SYSTOLIC BLOOD PRESSURE: 129 MMHG

## 2025-07-16 VITALS
RESPIRATION RATE: 14 BRPM | OXYGEN SATURATION: 96 % | HEART RATE: 60 BPM | TEMPERATURE: 96.8 F | SYSTOLIC BLOOD PRESSURE: 125 MMHG | DIASTOLIC BLOOD PRESSURE: 70 MMHG

## 2025-07-16 VITALS — SYSTOLIC BLOOD PRESSURE: 136 MMHG | DIASTOLIC BLOOD PRESSURE: 84 MMHG

## 2025-07-16 VITALS — SYSTOLIC BLOOD PRESSURE: 137 MMHG | DIASTOLIC BLOOD PRESSURE: 90 MMHG

## 2025-07-16 VITALS
SYSTOLIC BLOOD PRESSURE: 123 MMHG | OXYGEN SATURATION: 94 % | TEMPERATURE: 98.1 F | RESPIRATION RATE: 14 BRPM | HEART RATE: 57 BPM | DIASTOLIC BLOOD PRESSURE: 98 MMHG

## 2025-07-16 VITALS — SYSTOLIC BLOOD PRESSURE: 136 MMHG | DIASTOLIC BLOOD PRESSURE: 76 MMHG

## 2025-07-16 VITALS
SYSTOLIC BLOOD PRESSURE: 132 MMHG | RESPIRATION RATE: 14 BRPM | HEART RATE: 59 BPM | DIASTOLIC BLOOD PRESSURE: 68 MMHG | OXYGEN SATURATION: 95 %

## 2025-07-16 VITALS
DIASTOLIC BLOOD PRESSURE: 73 MMHG | RESPIRATION RATE: 14 BRPM | SYSTOLIC BLOOD PRESSURE: 129 MMHG | HEART RATE: 57 BPM | OXYGEN SATURATION: 100 %

## 2025-07-16 VITALS
DIASTOLIC BLOOD PRESSURE: 73 MMHG | SYSTOLIC BLOOD PRESSURE: 133 MMHG | RESPIRATION RATE: 14 BRPM | OXYGEN SATURATION: 94 % | HEART RATE: 57 BPM

## 2025-07-16 VITALS
RESPIRATION RATE: 16 BRPM | OXYGEN SATURATION: 96 % | DIASTOLIC BLOOD PRESSURE: 79 MMHG | SYSTOLIC BLOOD PRESSURE: 140 MMHG | TEMPERATURE: 96.98 F | HEART RATE: 77 BPM

## 2025-07-16 VITALS — TEMPERATURE: 96.98 F

## 2025-07-16 VITALS — BODY MASS INDEX: 30.2 KG/M2

## 2025-07-16 DIAGNOSIS — M16.11: Primary | ICD-10-CM

## 2025-07-16 DIAGNOSIS — G89.18: ICD-10-CM

## 2025-07-16 DIAGNOSIS — G57.91: ICD-10-CM

## 2025-07-16 DIAGNOSIS — Z85.46: ICD-10-CM

## 2025-07-16 DIAGNOSIS — N32.81: ICD-10-CM

## 2025-07-16 DIAGNOSIS — Z79.82: ICD-10-CM

## 2025-07-16 DIAGNOSIS — I10: ICD-10-CM

## 2025-07-16 DIAGNOSIS — M51.16: ICD-10-CM

## 2025-07-16 DIAGNOSIS — G47.33: ICD-10-CM

## 2025-07-16 PROCEDURE — 97161 PT EVAL LOW COMPLEX 20 MIN: CPT

## 2025-07-16 PROCEDURE — 97116 GAIT TRAINING THERAPY: CPT

## 2025-07-16 PROCEDURE — C1776 JOINT DEVICE (IMPLANTABLE): HCPCS

## 2025-07-16 PROCEDURE — 73501 X-RAY EXAM HIP UNI 1 VIEW: CPT

## 2025-07-16 PROCEDURE — 99100 ANES PT EXTEME AGE<1 YR&>70: CPT

## 2025-07-16 PROCEDURE — 97530 THERAPEUTIC ACTIVITIES: CPT

## 2025-07-16 PROCEDURE — 01214 ANES OPEN PX TOT HIP ARTHRP: CPT

## 2025-07-16 PROCEDURE — 86901 BLOOD TYPING SEROLOGIC RH(D): CPT

## 2025-07-16 PROCEDURE — 86850 RBC ANTIBODY SCREEN: CPT

## 2025-07-16 PROCEDURE — A9270 NON-COVERED ITEM OR SERVICE: HCPCS

## 2025-07-16 PROCEDURE — 76000 FLUOROSCOPY <1 HR PHYS/QHP: CPT

## 2025-07-16 PROCEDURE — 27130 TOTAL HIP ARTHROPLASTY: CPT

## 2025-07-16 PROCEDURE — 85018 HEMOGLOBIN: CPT

## 2025-07-16 PROCEDURE — 76942 ECHO GUIDE FOR BIOPSY: CPT

## 2025-07-16 PROCEDURE — 64450 NJX AA&/STRD OTHER PN/BRANCH: CPT

## 2025-07-16 PROCEDURE — 97165 OT EVAL LOW COMPLEX 30 MIN: CPT

## 2025-07-16 PROCEDURE — 36415 COLL VENOUS BLD VENIPUNCTURE: CPT

## 2025-07-16 PROCEDURE — 86900 BLOOD TYPING SEROLOGIC ABO: CPT

## 2025-07-16 PROCEDURE — 97535 SELF CARE MNGMENT TRAINING: CPT

## 2025-07-16 RX ADMIN — SENNOSIDES 2 TAB: 8.6 TABLET, FILM COATED ORAL at 20:42

## 2025-07-16 RX ADMIN — ACETAMINOPHEN 1000 MG: 500 TABLET ORAL at 19:31

## 2025-07-16 RX ADMIN — ACETAMINOPHEN 1000 MG: 500 TABLET ORAL at 13:17

## 2025-07-17 VITALS
HEART RATE: 73 BPM | SYSTOLIC BLOOD PRESSURE: 151 MMHG | OXYGEN SATURATION: 96 % | DIASTOLIC BLOOD PRESSURE: 82 MMHG | RESPIRATION RATE: 16 BRPM | TEMPERATURE: 97.9 F

## 2025-07-17 VITALS
TEMPERATURE: 97.34 F | RESPIRATION RATE: 16 BRPM | HEART RATE: 96 BPM | DIASTOLIC BLOOD PRESSURE: 92 MMHG | OXYGEN SATURATION: 96 % | SYSTOLIC BLOOD PRESSURE: 154 MMHG

## 2025-07-17 LAB — Lab: 10.3 GM/DL (ref 13.5–17.5)

## 2025-07-17 RX ADMIN — GABAPENTIN 200 MG: 100 CAPSULE ORAL at 09:36

## 2025-07-17 RX ADMIN — RIVAROXABAN 10 MG: 10 TABLET, FILM COATED ORAL at 07:28

## 2025-07-17 RX ADMIN — ACETAMINOPHEN 1000 MG: 500 TABLET ORAL at 07:28

## 2025-07-17 RX ADMIN — ACETAMINOPHEN 1000 MG: 500 TABLET ORAL at 01:16

## 2025-07-17 RX ADMIN — AMLODIPINE BESYLATE 5 MG: 5 TABLET ORAL at 07:28

## 2025-07-17 RX ADMIN — SENNOSIDES 2 TAB: 8.6 TABLET, FILM COATED ORAL at 07:32

## 2025-07-17 RX ADMIN — ALLOPURINOL 100 MG: 100 TABLET ORAL at 07:28
